# Patient Record
(demographics unavailable — no encounter records)

---

## 2025-04-21 NOTE — PHYSICAL EXAM
[No Acute Distress] : no acute distress [Well-Appearing] : well-appearing [Normal Sclera/Conjunctiva] : normal sclera/conjunctiva [EOMI] : extraocular movements intact [Coordination Grossly Intact] : coordination grossly intact [Normal Gait] : normal gait [Non Tender] : non-tender [Non-distended] : non-distended [Normal Bowel Sounds] : normal bowel sounds [Normal] : no posterior cervical lymphadenopathy and no anterior cervical lymphadenopathy

## 2025-04-21 NOTE — HISTORY OF PRESENT ILLNESS
[de-identified] : MARY JO (goes by TRICIA) JOSE JUAN is a 26 year y/o F with PMH of HLD, anxiety, depression, and OCD who presents as a new patient today to establish care. CC annual physical, has been trying to lose weight. Saw Ob[facility]yn earlier this year and they did bloodwork  #Nicotine Dependence Vaping Started after a breakup about 1 year ago Sometimes tries to chew gum instead Not ready to quit  #Trying to Lose Weight Currently not consistent with exercise But even when she was exercising consistently, notes that she wasn't losing weight Would like to further discuss  #Tired Not sure if she is burnt out from work, or if it is her meds Not willing to change any medications as she needs them   PMH: HLD, debilitating anxiety/OCD, was prescribed zoloft and has had to increase the dose a couple of times, gabapentin for anxiety, also tried wellbutrin in the past for depression but not currently on it. Has a therapist, psychiatrist, psychoanalyst. PSH: wisdom teeth Fam Hx: see chart Medications: zoloft 100 mg, gabapentin 100 mg PRN, hydroxyzine 25 mg?  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

## 2025-04-21 NOTE — HISTORY OF PRESENT ILLNESS
[de-identified] : MARY JO (goes by TRICIA) JOSE JUAN is a 26 year y/o F with PMH of HLD, anxiety, depression, and OCD who presents as a new patient today to establish care. CC annual physical, has been trying to lose weight. Saw Ob[facility]yn earlier this year and they did bloodwork  #Nicotine Dependence Vaping Started after a breakup about 1 year ago Sometimes tries to chew gum instead Not ready to quit  #Trying to Lose Weight Currently not consistent with exercise But even when she was exercising consistently, notes that she wasn't losing weight Would like to further discuss  #Tired Not sure if she is burnt out from work, or if it is her meds Not willing to change any medications as she needs them   PMH: HLD, debilitating anxiety/OCD, was prescribed zoloft and has had to increase the dose a couple of times, gabapentin for anxiety, also tried wellbutrin in the past for depression but not currently on it. Has a therapist, psychiatrist, psychoanalyst. PSH: wisdom teeth Fam Hx: see chart Medications: zoloft 100 mg, gabapentin 100 mg PRN, hydroxyzine 25 mg?  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

## 2025-04-21 NOTE — ASSESSMENT
[Vaccines Reviewed] : Immunizations reviewed today. Please see immunization details in the vaccine log within the immunization flowsheet.  [FreeTextEntry1] : #HM Reviewed recent bloodwork in depth and reviewed dietary modifications for cholesterol Unsure of last tdap UTD on pap following with Gyn Completed gardasil Will do bloodwork and select STI screening today (trich, HIV, syphilis as not checked at recent ObGyn appt) Has Mirena IUD Pt not ready to consider quitting vaping at this time

## 2025-04-21 NOTE — HEALTH RISK ASSESSMENT
[Yes] : Yes [2 - 4 times a month (2 pts)] : 2-4 times a month (2 points) [1 or 2 (0 pts)] : 1 or 2 (0 points) [Never (0 pts)] : Never (0 points) [0] : 2) Feeling down, depressed, or hopeless: Not at all (0) [PHQ-2 Negative - No further assessment needed] : PHQ-2 Negative - No further assessment needed [Patient reported PAP Smear was abnormal] : Patient reported PAP Smear was abnormal [HIV Test offered] : HIV Test offered [Hepatitis C test offered] : Hepatitis C test offered [Current] : Current [0-4] : 0-4 [NO] : No [PapSmearComments] : s/p colposcopy being closely followed by Gyn [de-identified] : vapes, not ready to quit Standing/Walking/Moving from bed to chair

## 2025-04-21 NOTE — PLAN
[FreeTextEntry1] : #HM - Bloodwork and STI screening today - Vaccine appt for tdap - F/u to discuss weight gain once blood test results are back - Continued smoking cessation discussions

## 2025-04-21 NOTE — DATA REVIEWED
[FreeTextEntry1] : We reviewed her most recent bloodwork from Ob[facility]yn visit 2/10/2025  CBC and CMP wnl Lipids: total chol 229 HDL 68  A1C 4.8 TSH wnl  G/C negative Pap Hx: LSIL s/p colposcopy with most recent pap 2/2025 HPV(-) ASCUS

## 2025-04-21 NOTE — HEALTH RISK ASSESSMENT
[Yes] : Yes [2 - 4 times a month (2 pts)] : 2-4 times a month (2 points) [1 or 2 (0 pts)] : 1 or 2 (0 points) [Never (0 pts)] : Never (0 points) [0] : 2) Feeling down, depressed, or hopeless: Not at all (0) [PHQ-2 Negative - No further assessment needed] : PHQ-2 Negative - No further assessment needed [Patient reported PAP Smear was abnormal] : Patient reported PAP Smear was abnormal [HIV Test offered] : HIV Test offered [Hepatitis C test offered] : Hepatitis C test offered [Current] : Current [0-4] : 0-4 [NO] : No [PapSmearComments] : s/p colposcopy being closely followed by Gyn [de-identified] : vapes, not ready to quit

## 2025-06-27 NOTE — HISTORY OF PRESENT ILLNESS
[de-identified] : MARY JO MANZANO is a 26 year y/o F who presents for follow-up today. CC night sweats, brain zaps  #Night Sweats #?Medication Side Effect Has been going on for a long time Pt aware that this can be a side effect of sertraline but is anxious about it Has been on sertraline since 2023, shares that night sweats line up with that timeline, have worsened with increased dosage and are now consistent Has tried sleeping in different materials, different bedding, a cooling mattress cover and blanket, sleeps with room super cold Feels like her body is hot, concentrated in lower extremities and trunk No unexplained weight loss, no change in appetite, no fevers Easier sweating in general  #Brain Zaps Pt describes that if she turns her head it's like an electric shock or static feeling Also happens with standing up Occurs all over her body and lasts for a few seconds Also notes sore neck x 2 weeks now resolved Thinks also related to SSRI  #GI Concerns [place] Felt constipated for 3-4 days Now with loose stools No N/V, no blood, no cramping or abdominal pain Normal appetite

## 2025-06-27 NOTE — ASSESSMENT
[FreeTextEntry1] : #Night Sweats Likely medication side effect, pt would like to do bloodwork. No decreased appetite or unexplained weight loss. Symptoms correspond to sertraline initiation and dose increases. We discussed changing out of wet clothing overnight in addition to keeping the room cool, discussing the pros and cons of this medication with her prescriber. - Discuss medication management with psychiatrist - Symptomatic management - Check CBC, TSH  #Brain Zaps Pt notes sensation of brain zaps which she thinks are associated with SSRI usage, normal neuro exam. Not c/w Lhermitte sign as the sensation occurs throughout her whole body and is not specific to neck movement. - Check B12, TSH, CBC - Discuss medication management with psychiatrist  #Change in Stools #Loose Stools Watery stools over the past 2 days, sometimes randomly loose stools, has always kind of had stomach issues which usually clear up within a week or so but this is lasting longer than normal for her. No blood, no cramping, no N/V, no fevers. Has been eating the same things that she usually does.  - Trial of BRAT diet, as well as hydration - Trial of probiotics - Can refer to GI if symptoms do not improve.   I have spent 43 minutes of time on the encounter which excludes teaching and/or separately reported services.

## 2025-06-27 NOTE — PHYSICAL EXAM
[No Acute Distress] : no acute distress [Well-Appearing] : well-appearing [Normal Voice/Communication] : normal voice/communication [Speech Grossly Normal] : speech grossly normal [Normal Mood] : the mood was normal [Normal Sclera/Conjunctiva] : normal sclera/conjunctiva [EOMI] : extraocular movements intact [Normal Outer Ear/Nose] : the outer ears and nose were normal in appearance [Normal] : normal rate, regular rhythm, normal S1 and S2 and no murmur heard [Soft] : abdomen soft [Non Tender] : non-tender [Non-distended] : non-distended [Normal Bowel Sounds] : normal bowel sounds [Coordination Grossly Intact] : coordination grossly intact [No Focal Deficits] : no focal deficits [Normal Gait] : normal gait